# Patient Record
Sex: FEMALE | Race: BLACK OR AFRICAN AMERICAN | Employment: FULL TIME | URBAN - NONMETROPOLITAN AREA
[De-identification: names, ages, dates, MRNs, and addresses within clinical notes are randomized per-mention and may not be internally consistent; named-entity substitution may affect disease eponyms.]

---

## 2019-05-21 ENCOUNTER — HOSPITAL ENCOUNTER (EMERGENCY)
Age: 44
Discharge: HOME | End: 2019-05-21

## 2019-05-21 VITALS
HEART RATE: 97 BPM | RESPIRATION RATE: 16 BRPM | WEIGHT: 154.6 LBS | SYSTOLIC BLOOD PRESSURE: 125 MMHG | TEMPERATURE: 98.5 F | OXYGEN SATURATION: 99 % | DIASTOLIC BLOOD PRESSURE: 96 MMHG

## 2019-05-21 DIAGNOSIS — K02.9 DENTAL DECAY: ICD-10-CM

## 2019-05-21 DIAGNOSIS — S02.5XXB OPEN FRACTURE OF TOOTH, INITIAL ENCOUNTER: Primary | ICD-10-CM

## 2019-05-21 DIAGNOSIS — K04.7 DENTAL ABSCESS: ICD-10-CM

## 2019-05-21 PROCEDURE — 99202 OFFICE O/P NEW SF 15 MIN: CPT

## 2019-05-21 PROCEDURE — 64450 NJX AA&/STRD OTHER PN/BRANCH: CPT

## 2019-05-21 PROCEDURE — 99202 OFFICE O/P NEW SF 15 MIN: CPT | Performed by: NURSE PRACTITIONER

## 2019-05-21 RX ORDER — AMOXICILLIN 500 MG/1
500 CAPSULE ORAL 3 TIMES DAILY
Qty: 30 CAPSULE | Refills: 0 | Status: SHIPPED | OUTPATIENT
Start: 2019-05-21 | End: 2019-05-31

## 2019-05-21 RX ORDER — NICOTINE 14MG/24HR
1 PATCH, TRANSDERMAL 24 HOURS TRANSDERMAL DAILY
Qty: 30 CAPSULE | Refills: 0 | Status: SHIPPED | OUTPATIENT
Start: 2019-05-21 | End: 2019-06-20

## 2019-05-21 RX ORDER — CHLORHEXIDINE GLUCONATE 0.12 MG/ML
15 RINSE ORAL 2 TIMES DAILY
Qty: 420 ML | Refills: 0 | Status: SHIPPED | OUTPATIENT
Start: 2019-05-21 | End: 2019-06-04

## 2019-05-21 RX ORDER — LIDOCAINE HYDROCHLORIDE 20 MG/ML
5 INJECTION, SOLUTION INFILTRATION; PERINEURAL ONCE
Status: DISCONTINUED | OUTPATIENT
Start: 2019-05-21 | End: 2019-05-21 | Stop reason: HOSPADM

## 2019-05-21 RX ORDER — ACETAMINOPHEN AND CODEINE PHOSPHATE 300; 30 MG/1; MG/1
1-2 TABLET ORAL EVERY 8 HOURS PRN
Qty: 15 TABLET | Refills: 0 | Status: SHIPPED | OUTPATIENT
Start: 2019-05-21 | End: 2019-05-24

## 2019-05-21 SDOH — HEALTH STABILITY: MENTAL HEALTH: HOW OFTEN DO YOU HAVE A DRINK CONTAINING ALCOHOL?: NEVER

## 2019-05-21 ASSESSMENT — PAIN DESCRIPTION - LOCATION
LOCATION: TEETH
LOCATION: TEETH

## 2019-05-21 ASSESSMENT — ENCOUNTER SYMPTOMS
CHOKING: 0
CHEST TIGHTNESS: 0
SINUS PRESSURE: 1
COUGH: 0
FACIAL SWELLING: 1
APNEA: 0
RHINORRHEA: 1
SHORTNESS OF BREATH: 0
WHEEZING: 0
STRIDOR: 0

## 2019-05-21 ASSESSMENT — PAIN SCALES - GENERAL
PAINLEVEL_OUTOF10: 10
PAINLEVEL_OUTOF10: 8

## 2019-05-21 ASSESSMENT — PAIN DESCRIPTION - FREQUENCY: FREQUENCY: CONTINUOUS

## 2019-05-21 ASSESSMENT — PAIN DESCRIPTION - PAIN TYPE
TYPE: ACUTE PAIN
TYPE: ACUTE PAIN

## 2019-05-21 ASSESSMENT — PAIN DESCRIPTION - ORIENTATION
ORIENTATION: LOWER;LEFT
ORIENTATION: LEFT

## 2019-05-21 ASSESSMENT — PAIN DESCRIPTION - DESCRIPTORS: DESCRIPTORS: ACHING;THROBBING

## 2019-05-21 NOTE — ED PROVIDER NOTES
[place] [id number]  Urgent Care Encounter      CHIEF COMPLAINT       Chief Complaint   Patient presents with    Dental Pain     lower left dental pain       Nurses Notes reviewed and I agree except as noted in the HPI. HISTORY OFPRESENT ILLNESS   Verita Duane is a 38 y.o. The history is provided by the patient. No  was used. Dental Pain   Location:  Lower  Lower teeth location:  23/LL lateral incisor  Quality:  Aching, localized, pulsating, shooting and throbbing  Severity:  Severe  Onset quality:  Sudden  Duration:  2 days  Timing:  Constant  Progression:  Worsening  Chronicity:  Recurrent  Context: abscess, dental caries, intrusion, malocclusion and poor dentition    Context: cap still on, not crown fracture, not dental fracture, not enamel fracture, filling intact, not recent dental surgery and not trauma    Relieved by:  Nothing  Worsened by:  Nothing  Associated symptoms: congestion, facial pain, facial swelling, gum swelling, headaches, neck pain and oral lesions    Associated symptoms: no fever    Risk factors: lack of dental care    Risk factors: no alcohol problem, no cancer, no chewing tobacco use, no diabetes, no immunosuppression, no periodontal disease and no smoking        REVIEW OF SYSTEMS     Review of Systems   Constitutional: Negative for activity change, appetite change, chills, diaphoresis, fatigue and fever. HENT: Positive for congestion, facial swelling, mouth sores, rhinorrhea and sinus pressure. Respiratory: Negative for apnea, cough, choking, chest tightness, shortness of breath, wheezing and stridor. Cardiovascular: Negative for chest pain, palpitations and leg swelling. Musculoskeletal: Positive for neck pain. Neurological: Positive for headaches. Negative for dizziness and light-headedness. Hematological: Positive for adenopathy. PAST MEDICAL HISTORY   History reviewed. No pertinent past medical history.     SURGICAL HISTORY Patient  has no past surgical history on file. CURRENT MEDICATIONS       Previous Medications    No medications on file       ALLERGIES     Patient is has No Known Allergies. FAMILY HISTORY     Patient's family history is not on file. SOCIAL HISTORY     Patient  reports that she has never smoked. She has never used smokeless tobacco. She reports that she does not drink alcohol or use drugs. PHYSICAL EXAM     ED TRIAGE VITALS  BP: (!) 131/94, Temp: 98.4 °F (36.9 °C), Pulse: 89, Resp: 18, SpO2: 99 %  Physical Exam   Constitutional: She is oriented to person, place, and time. She appears well-developed and well-nourished. No distress. HENT:   Head: Normocephalic and atraumatic. Mouth/Throat:       5 ML's 2% lidocaine and injected into the inferior alveolar block patient tolerated well reports relief immediately. Patient was educated on risks and progression. No questions or concerns voiced at this time. Eyes: Conjunctivae and EOM are normal.   Neck: Normal range of motion. Pulmonary/Chest: Effort normal and breath sounds normal.   Lymphadenopathy:        Head (right side): No submental, no submandibular, no tonsillar, no preauricular, no posterior auricular and no occipital adenopathy present. Head (left side): No submental, no submandibular, no tonsillar, no preauricular, no posterior auricular and no occipital adenopathy present. Neurological: She is alert and oriented to person, place, and time. Skin: Skin is warm and dry. She is not diaphoretic. Psychiatric: She has a normal mood and affect. Her behavior is normal. Judgment and thought content normal.   Nursing note and vitals reviewed. DIAGNOSTIC RESULTS   Labs:No results found for this visit on 05/21/19.     IMAGING:  No orders to display     URGENT CARE COURSE:     Vitals:    05/21/19 1218   BP: (!) 131/94   Pulse: 89   Resp: 18   Temp: 98.4 °F (36.9 °C)   TempSrc: Temporal   SpO2: 99%   Weight: 154 lb 9.6 oz (70.1 kg)       Medications   lidocaine 2 % injection 5 mL (has no administration in time range)     PROCEDURES:  None  FINAL IMPRESSION      1. Open fracture of tooth, initial encounter    2. Dental abscess    3. Dental decay        DISPOSITION/PLAN   Decision To Discharge         The patient was also advised to eat a soft diet, chew on the opposite side of pain, Use a soft bristle toothbrush and warm saltwater lavage after eating. The patient was advised to take the medication as directed. The patient was also advised to follow-up with a local dentist ASAP. We also discussed returning to the Emergency Department immediately if new or worsening symptoms occur. We have discussed the symptoms which are most concerning that necessitate immediate return. PATIENT REFERRED TO:  [facility]  [id number] [facility] [address]  [phone]  Schedule an appointment as soon as possible for a visit today      DISCHARGE MEDICATIONS:  New Prescriptions    ACETAMINOPHEN-CODEINE (TYLENOL #3) 300-30 MG PER TABLET    Take 1-2 tablets by mouth every 8 hours as needed for Pain for up to 3 days.     AMOXICILLIN (AMOXIL) 500 MG CAPSULE    Take 1 capsule by mouth 3 times daily for 10 days    CHLORHEXIDINE (PERIDEX) 0.12 % SOLUTION    Take 15 mLs by mouth 2 times daily for 14 days    SACCHAROMYCES BOULARDII (PROBIOTIC) 250 MG CAPS    Take 1 capsule by mouth daily     Current Discharge Medication List          GIOVANA Ervin - GIOVANA Hayden CNP  05/21/19 6607